# Patient Record
Sex: FEMALE | Race: WHITE | NOT HISPANIC OR LATINO | Employment: OTHER | URBAN - NONMETROPOLITAN AREA
[De-identification: names, ages, dates, MRNs, and addresses within clinical notes are randomized per-mention and may not be internally consistent; named-entity substitution may affect disease eponyms.]

---

## 2017-02-06 ENCOUNTER — OFFICE VISIT (OUTPATIENT)
Dept: OTOLARYNGOLOGY | Facility: CLINIC | Age: 22
End: 2017-02-06

## 2017-02-06 VITALS — WEIGHT: 213 LBS | HEIGHT: 65 IN | BODY MASS INDEX: 35.49 KG/M2 | TEMPERATURE: 98.9 F

## 2017-02-06 DIAGNOSIS — R22.0 SWELLING OF RIGHT SIDE OF FACE: Primary | ICD-10-CM

## 2017-02-06 PROCEDURE — 99203 OFFICE O/P NEW LOW 30 MIN: CPT | Performed by: OTOLARYNGOLOGY

## 2017-02-07 NOTE — PROGRESS NOTES
Subjective   Shelley Huggins is a 21 y.o. female.     History of Present Illness this a patient that I have not seen since 2012.  She reportedly had abrupt onset of right-sided facial swelling 5 days ago.  This reportedly involved the periorbital region as well as the preauricular and malar region.  Was seen at an urgent care center.  Reportedly had sinus x-rays that were negative.  Was given Cefzil and is improving.  Doesn't seem to get any better or worse with eating.  No otorrhea.  Nothing in particular seemed to bring this on.  Has a history of canal wall down mastoidectomy on the left.    The following portions of the patient's history were reviewed and updated as appropriate: allergies, current medications, past family history, past medical history, past social history, past surgical history and problem list.      Shelley Huggins reports that she has never smoked. She does not have any smokeless tobacco history on file.  Patient is not a tobacco user and has not been counseled for use of tobacco products    No family history on file.      Current Outpatient Prescriptions:   •  cefprozil (CEFZIL) 250 MG/5ML suspension, 10 ml by mouth twice daily for 10 days, Disp: 100 mL, Rfl: 0      No past medical history on file.  Autism    Review of Systems   Constitutional: Negative for fever.   HENT: Negative for trouble swallowing.    All other systems reviewed and are negative.          Objective   Physical Exam  General: Well-developed well-nourished female in no acute distress.  Is basically noncommunicative but does eventually cooperate with exam.  Ears: External ears no deformity.  Right ear canal shows no discharge.  Tympanic membrane intact no infection or effusion.  Left ear shows canal wall down mastoid cavity with only minimal wax and no discharge or purulence  Nose: Nares show no discharge mass polyp or purulence.  Boggy mucosa is present.  No gross external deformity.  Septum: Midline  Oral cavity:  Lips and gums without lesions.  Tongue and floor of mouth without lesions.  Parotid and submandibular ducts unobstructed.  No mucosal lesions on the buccal mucosa or vestibule of the mouth.  Teeth: There appears to be a broken tooth with obvious caries in a right maxillary molar  Pharynx: No erythema or exudate  Neck: No lymphadenopathy.  No thyromegaly.  Trachea and larynx midline.  No masses in the parotid or submandibular glands.  Face: Mild edema but no erythema of the skin of the left cheek.        Assessment/Plan   Shelley was seen today for facial swelling.    Diagnoses and all orders for this visit:    Swelling of right side of face      Plan: Told mom I thought this was likely a manifestation of a dental infection.  She is artery on Cefzil and this should be continued, but recommended a dental evaluation which mom states she already has an appointment for area may follow me as needed.

## 2018-10-26 ENCOUNTER — APPOINTMENT (OUTPATIENT)
Dept: LAB | Facility: HOSPITAL | Age: 23
End: 2018-10-26

## 2018-10-26 PROCEDURE — 85025 COMPLETE CBC W/AUTO DIFF WBC: CPT | Performed by: NURSE PRACTITIONER

## 2018-10-26 PROCEDURE — 80053 COMPREHEN METABOLIC PANEL: CPT | Performed by: NURSE PRACTITIONER

## 2018-10-26 PROCEDURE — 85379 FIBRIN DEGRADATION QUANT: CPT | Performed by: NURSE PRACTITIONER

## 2019-01-17 ENCOUNTER — APPOINTMENT (OUTPATIENT)
Dept: ULTRASOUND IMAGING | Facility: HOSPITAL | Age: 24
End: 2019-01-17

## 2019-01-17 ENCOUNTER — TELEPHONE (OUTPATIENT)
Dept: GENERAL RADIOLOGY | Facility: HOSPITAL | Age: 24
End: 2019-01-17

## 2019-01-17 NOTE — TELEPHONE ENCOUNTER
Called and spoke to the doctors of to inform them that the patient was a no show for appointment on 01/17/2019 at 11:15am for a US Abdomen complete spoke to alissa at the

## 2019-01-24 ENCOUNTER — TELEPHONE (OUTPATIENT)
Dept: GENERAL RADIOLOGY | Facility: HOSPITAL | Age: 24
End: 2019-01-24

## 2019-02-21 ENCOUNTER — APPOINTMENT (OUTPATIENT)
Dept: ULTRASOUND IMAGING | Facility: HOSPITAL | Age: 24
End: 2019-02-21

## 2019-02-21 ENCOUNTER — TELEPHONE (OUTPATIENT)
Dept: GENERAL RADIOLOGY | Facility: HOSPITAL | Age: 24
End: 2019-02-21

## 2019-02-21 NOTE — TELEPHONE ENCOUNTER
PT: JAVON DA SILVA : 1995, DIDN'T SHOW UP FOR US ABDOMEN COMPLETE APPOINTMENT ON 2019 AT 10:15AM. A MESSAGE WAS LEFT WITH AMBER AT  OFFICE

## 2019-02-22 ENCOUNTER — HOSPITAL ENCOUNTER (OUTPATIENT)
Dept: ULTRASOUND IMAGING | Facility: HOSPITAL | Age: 24
Discharge: HOME | End: 2019-02-22
Admitting: FAMILY MEDICINE

## 2019-02-22 DIAGNOSIS — R11.10 VOMITING, INTRACTABILITY OF VOMITING NOT SPECIFIED, PRESENCE OF NAUSEA NOT SPECIFIED, UNSPECIFIED VOMITING TYPE: ICD-10-CM

## 2019-02-22 PROCEDURE — 76700 US EXAM ABDOM COMPLETE: CPT

## 2019-03-14 ENCOUNTER — APPOINTMENT (OUTPATIENT)
Dept: ULTRASOUND IMAGING | Facility: HOSPITAL | Age: 24
End: 2019-03-14

## 2019-03-14 ENCOUNTER — HOSPITAL ENCOUNTER (EMERGENCY)
Facility: HOSPITAL | Age: 24
Discharge: HOME | End: 2019-03-14
Attending: FAMILY MEDICINE | Admitting: FAMILY MEDICINE

## 2019-03-14 ENCOUNTER — APPOINTMENT (OUTPATIENT)
Dept: CT IMAGING | Facility: HOSPITAL | Age: 24
End: 2019-03-14

## 2019-03-14 VITALS
TEMPERATURE: 97.7 F | HEIGHT: 66 IN | SYSTOLIC BLOOD PRESSURE: 162 MMHG | HEART RATE: 89 BPM | OXYGEN SATURATION: 98 % | WEIGHT: 215 LBS | DIASTOLIC BLOOD PRESSURE: 79 MMHG | BODY MASS INDEX: 34.55 KG/M2 | RESPIRATION RATE: 22 BRPM

## 2019-03-14 DIAGNOSIS — R10.11 RIGHT UPPER QUADRANT ABDOMINAL PAIN: Primary | ICD-10-CM

## 2019-03-14 DIAGNOSIS — K80.20 CALCULUS OF GALLBLADDER WITHOUT CHOLECYSTITIS WITHOUT OBSTRUCTION: ICD-10-CM

## 2019-03-14 LAB
ALBUMIN SERPL-MCNC: 5.1 G/DL (ref 3.4–4.8)
ALBUMIN/GLOB SERPL: 1.3 G/DL (ref 1.1–1.8)
ALP SERPL-CCNC: 88 U/L (ref 38–126)
ALT SERPL W P-5'-P-CCNC: 18 U/L (ref 9–52)
ANION GAP SERPL CALCULATED.3IONS-SCNC: 17 MMOL/L (ref 5–15)
AST SERPL-CCNC: 31 U/L (ref 14–36)
BASOPHILS # BLD AUTO: 0.07 10*3/MM3 (ref 0–0.2)
BASOPHILS NFR BLD AUTO: 0.5 % (ref 0–1.5)
BILIRUB SERPL-MCNC: 0.5 MG/DL (ref 0.2–1.3)
BUN BLD-MCNC: 10 MG/DL (ref 7–21)
BUN/CREAT SERPL: 10.5 (ref 7–25)
CALCIUM SPEC-SCNC: 10.3 MG/DL (ref 8.4–10.2)
CHLORIDE SERPL-SCNC: 103 MMOL/L (ref 95–110)
CO2 SERPL-SCNC: 18 MMOL/L (ref 22–31)
CREAT BLD-MCNC: 0.95 MG/DL (ref 0.5–1)
DEPRECATED RDW RBC AUTO: 41.5 FL (ref 37–54)
EOSINOPHIL # BLD AUTO: 0.03 10*3/MM3 (ref 0–0.4)
EOSINOPHIL NFR BLD AUTO: 0.2 % (ref 0.3–6.2)
ERYTHROCYTE [DISTWIDTH] IN BLOOD BY AUTOMATED COUNT: 13.7 % (ref 12.3–15.4)
GFR SERPL CREATININE-BSD FRML MDRD: 74 ML/MIN/1.73 (ref 71–165)
GLOBULIN UR ELPH-MCNC: 3.8 GM/DL (ref 2.3–3.5)
GLUCOSE BLD-MCNC: 106 MG/DL (ref 60–100)
HCT VFR BLD AUTO: 44.1 % (ref 34–46.6)
HGB BLD-MCNC: 14.7 G/DL (ref 12–15.9)
HOLD SPECIMEN: NORMAL
HOLD SPECIMEN: NORMAL
IMM GRANULOCYTES # BLD AUTO: 0.06 10*3/MM3 (ref 0–0.05)
IMM GRANULOCYTES NFR BLD AUTO: 0.5 % (ref 0–0.5)
LIPASE SERPL-CCNC: 121 U/L (ref 23–300)
LYMPHOCYTES # BLD AUTO: 2.85 10*3/MM3 (ref 0.7–3.1)
LYMPHOCYTES NFR BLD AUTO: 21.7 % (ref 19.6–45.3)
MCH RBC QN AUTO: 28 PG (ref 26.6–33)
MCHC RBC AUTO-ENTMCNC: 33.3 G/DL (ref 31.5–35.7)
MCV RBC AUTO: 84 FL (ref 79–97)
MONOCYTES # BLD AUTO: 0.74 10*3/MM3 (ref 0.1–0.9)
MONOCYTES NFR BLD AUTO: 5.6 % (ref 5–12)
NEUTROPHILS # BLD AUTO: 9.37 10*3/MM3 (ref 1.4–7)
NEUTROPHILS NFR BLD AUTO: 71.5 % (ref 42.7–76)
NRBC BLD AUTO-RTO: 0 /100 WBC (ref 0–0)
PLATELET # BLD AUTO: 422 10*3/MM3 (ref 140–450)
PMV BLD AUTO: 10.1 FL (ref 6–12)
POTASSIUM BLD-SCNC: 3.4 MMOL/L (ref 3.5–5.1)
PROT SERPL-MCNC: 8.9 G/DL (ref 6.3–8.6)
RBC # BLD AUTO: 5.25 10*6/MM3 (ref 3.77–5.28)
SODIUM BLD-SCNC: 138 MMOL/L (ref 137–145)
WBC NRBC COR # BLD: 13.12 10*3/MM3 (ref 3.4–10.8)
WHOLE BLOOD HOLD SPECIMEN: NORMAL
WHOLE BLOOD HOLD SPECIMEN: NORMAL

## 2019-03-14 PROCEDURE — 83690 ASSAY OF LIPASE: CPT | Performed by: FAMILY MEDICINE

## 2019-03-14 PROCEDURE — 80053 COMPREHEN METABOLIC PANEL: CPT | Performed by: FAMILY MEDICINE

## 2019-03-14 PROCEDURE — 85025 COMPLETE CBC W/AUTO DIFF WBC: CPT | Performed by: FAMILY MEDICINE

## 2019-03-14 PROCEDURE — 99283 EMERGENCY DEPT VISIT LOW MDM: CPT

## 2019-03-14 PROCEDURE — 76700 US EXAM ABDOM COMPLETE: CPT

## 2019-03-14 RX ORDER — SODIUM CHLORIDE 0.9 % (FLUSH) 0.9 %
10 SYRINGE (ML) INJECTION AS NEEDED
Status: DISCONTINUED | OUTPATIENT
Start: 2019-03-14 | End: 2019-03-15 | Stop reason: HOSPADM

## 2019-03-15 NOTE — ED NOTES
"Mother of patient became upset stating that nothing is being done about her daughters pain and the ER hasn't done anything to find out what is wrong with her daughter. Nurse apologized for any delays and explained to the mother that pain medication was offered and explained the test that were performed and why. Dr. Ruthann MD tried to reason with, and explain to the patients mother the results of the tests done in the ER. Mother demanded her daughters IV be taken out and that they were leaving and going to another hospital. The nurse inquired what the ER could have done better or differently to make their experience better and the mother again stated, \"no one has done anything about her pain and we've been here 8 hours (ER stay time 5.5 hours only) and you all haven't done anything to find out why she is hurting.\" Again pain medication was offered and refused and a second nurse offered to show the mother test results. She denied wanting to see the results stating, \"I don't believe ya'll even did anything.\" Patients IV was removed at mother's request and they left without discharge papers or signing AMA form.     Praveena Cohn, RN  03/14/19 [id number]    "

## 2019-03-15 NOTE — ED PROVIDER NOTES
"Subjective     History provided by:  Patient and parent  History limited by: autistic.   used: No    Abdominal Pain   Pain location:  RUQ  Pain quality: cramping    Pain radiates to:  Does not radiate  Pain severity:  Moderate  Onset quality:  Gradual  Timing:  Constant  Progression:  Worsening  Chronicity:  Recurrent  Context: not eating    Relieved by:  Nothing  Worsened by:  Nothing  Ineffective treatments:  None tried  Associated symptoms: nausea and vomiting    Nausea:     Severity:  Mild    Onset quality:  Gradual  patient was seen about 2 weeks was diagnosed with cho lithiasis. Mother said that patient     Review of Systems   Gastrointestinal: Positive for abdominal pain, nausea and vomiting.   All other systems reviewed and are negative.      History reviewed. No pertinent past medical history.    Allergies   Allergen Reactions   • Penicillins Rash       Past Surgical History:   Procedure Laterality Date   • EAR TUBES Bilateral     multiple sets of ear tubes   • MASTOIDECTOMY Left    • OSSICULAR RECONSTRUCTION         Family History   Problem Relation Age of Onset   • Hypertension Mother        Social History     Socioeconomic History   • Marital status: Single     Spouse name: Not on file   • Number of children: Not on file   • Years of education: Not on file   • Highest education level: Not on file   Tobacco Use   • Smoking status: Never Smoker   • Smokeless tobacco: Never Used       /79 (BP Location: Left arm, Patient Position: Lying)   Pulse 89   Temp 97.7 °F (36.5 °C) (Oral)   Resp 22   Ht 167.6 cm (66\")   Wt 97.5 kg (215 lb)   LMP 02/28/2019 (Approximate)   SpO2 98%   BMI 34.70 kg/m²     Objective   Physical Exam   Constitutional: She is oriented to person, place, and time. She appears well-developed and well-nourished.   HENT:   Head: Normocephalic and atraumatic.   Eyes: EOM are normal. Pupils are equal, round, and reactive to light.   Cardiovascular: Normal rate, " regular rhythm, normal heart sounds and intact distal pulses.   Pulmonary/Chest: Effort normal and breath sounds normal.   Abdominal: Soft. Normal appearance. Bowel sounds are increased. There is tenderness. There is no rebound and no CVA tenderness.   Neurological: She is alert and oriented to person, place, and time.   Skin: Skin is warm. Capillary refill takes less than 2 seconds.   Nursing note and vitals reviewed.      Procedures           ED Course      Patient was offered pain medication but the mother refused. Result was discussed with mother and patient. Mother was not happy immediately I came to the room. Said that they have been in ER for a long time.  Told to come back if worse.          MDM      Final diagnoses:   Right upper quadrant abdominal pain   Calculus of gallbladder without cholecystitis without obstruction            Shar Beaevrs MD  03/14/19 0276

## 2019-03-19 PROBLEM — S93.602A SPRAIN OF LEFT FOOT: Status: ACTIVE | Noted: 2019-03-19

## 2020-06-15 ENCOUNTER — TELEPHONE (OUTPATIENT)
Dept: FAMILY MEDICINE CLINIC | Facility: CLINIC | Age: 25
End: 2020-06-15

## 2020-06-15 NOTE — TELEPHONE ENCOUNTER
Mother called for patient due to patient being autistic, needing her to get in with a FEMALE provider.     I looked up our first available appointment with a female provider per her request and the first we have is 06/24/2020 with Dr Jo Madison (at this time) as she would be a transfer of care.     Mother advised she wanted her to be seen sooner than.  I advised per her request as she wanted to see a female only this was our first available.     She then just hung up on me.     Jo GHOTRA